# Patient Record
Sex: FEMALE | Race: WHITE | NOT HISPANIC OR LATINO | Employment: FULL TIME | URBAN - METROPOLITAN AREA
[De-identification: names, ages, dates, MRNs, and addresses within clinical notes are randomized per-mention and may not be internally consistent; named-entity substitution may affect disease eponyms.]

---

## 2017-01-23 ENCOUNTER — OFFICE VISIT (OUTPATIENT)
Dept: FAMILY MEDICINE | Facility: CLINIC | Age: 37
End: 2017-01-23
Payer: COMMERCIAL

## 2017-01-23 VITALS
HEART RATE: 95 BPM | OXYGEN SATURATION: 97 % | DIASTOLIC BLOOD PRESSURE: 70 MMHG | SYSTOLIC BLOOD PRESSURE: 122 MMHG | WEIGHT: 142.63 LBS | BODY MASS INDEX: 28 KG/M2 | HEIGHT: 60 IN | TEMPERATURE: 99 F

## 2017-01-23 DIAGNOSIS — R51.9 HEADACHE, UNSPECIFIED HEADACHE TYPE: Primary | ICD-10-CM

## 2017-01-23 PROCEDURE — 1159F MED LIST DOCD IN RCRD: CPT | Mod: S$GLB,,, | Performed by: FAMILY MEDICINE

## 2017-01-23 PROCEDURE — 99202 OFFICE O/P NEW SF 15 MIN: CPT | Mod: S$GLB,,, | Performed by: FAMILY MEDICINE

## 2017-01-23 RX ORDER — ETONOGESTREL AND ETHINYL ESTRADIOL VAGINAL RING .015; .12 MG/D; MG/D
1 RING VAGINAL
COMMUNITY
End: 2022-12-19

## 2017-01-23 RX ORDER — METOPROLOL TARTRATE 25 MG/1
25 TABLET, FILM COATED ORAL 2 TIMES DAILY
Qty: 60 TABLET | Refills: 3 | Status: SHIPPED | OUTPATIENT
Start: 2017-01-23 | End: 2017-05-30 | Stop reason: SDUPTHER

## 2017-01-23 RX ORDER — AMOXICILLIN 500 MG
2 CAPSULE ORAL DAILY
COMMUNITY
End: 2022-12-19

## 2017-01-23 RX ORDER — CHOLECALCIFEROL (VITAMIN D3) 25 MCG
12000 TABLET ORAL DAILY
COMMUNITY
End: 2022-12-19

## 2017-01-23 RX ORDER — ACETAMINOPHEN AND PHENYLEPHRINE HCL 325; 5 MG/1; MG/1
TABLET ORAL DAILY
COMMUNITY
End: 2022-12-19

## 2017-01-23 RX ORDER — SUMATRIPTAN SUCCINATE 100 MG/1
100 TABLET ORAL
Qty: 9 TABLET | Refills: 0 | Status: SHIPPED | OUTPATIENT
Start: 2017-01-23 | End: 2017-02-01

## 2017-01-23 NOTE — MR AVS SNAPSHOT
[facility]  [address] [place] [id number]  Phone: [phone]  Fax: [phone]                  Nora Carbajal   2017 1:20 PM   Office Visit    Description:  Female : 1980   Provider:  Jose Fernandez MD   Department:  [facility]           Reason for Visit     Headache     Tinnitus                To Do List           Goals (5 Years of Data)     None       These Medications        Disp Refills Start End    metoprolol tartrate (LOPRESSOR) 25 MG tablet 60 tablet 3 2017    Take 1 tablet (25 mg total) by mouth 2 (two) times daily. - Oral    Pharmacy: [facility]/pharmacy #[id number] [address] AT [facility] #: [phone]       sumatriptan (IMITREX) 100 MG tablet 9 tablet 0 2017    Take 1 tablet (100 mg total) by mouth every 2 (two) hours as needed for Migraine. - Oral    Pharmacy: [facility]/pharmacy #[id number] [address] AT [facility] Ph #: [phone]         [facility] On [facility]     [facility] On Call Nurse Care Line -  Assistance  Registered nurses in the [facility] On Call Center provide clinical advisement, health education, appointment booking, and other advisory services.  Call for this free service at [phone].             Medications           Message regarding Medications     Verify the changes and/or additions to your medication regime listed below are the same as discussed with your clinician today.  If any of these changes or additions are incorrect, please notify your healthcare provider.        START taking these NEW medications        Refills    metoprolol tartrate (LOPRESSOR) 25 MG tablet 3    Sig: Take 1 tablet (25 mg total) by mouth 2 (two) times daily.    Class: Normal    Route: Oral    sumatriptan (IMITREX) 100 MG tablet 0    Sig: Take 1 tablet (100 mg total) by mouth every 2 (two) hours as needed for Migraine.    Class: Normal    Route:  Oral           Verify that the below list of medications is an accurate representation of the medications you are currently taking.  If none reported, the list may be blank. If incorrect, please contact your healthcare provider. Carry this list with you in case of emergency.           Current Medications     biotin 10,000 mcg Cap Take by mouth once daily.    etonogestrel-ethinyl estradiol (NUVARING) 0.12-0.015 mg/24 hr vaginal ring Place 1 each vaginally every 28 days.    ELLYN PRIMROSE/LINOLEIC/GAMOLENI (PRIMROSE OIL ORAL) Take by mouth.    fish oil-omega-3 fatty acids 300-1,000 mg capsule Take 2 g by mouth once daily.    vitamin D 1000 units Tab Take 12,000 mg by mouth once daily.    metoprolol tartrate (LOPRESSOR) 25 MG tablet Take 1 tablet (25 mg total) by mouth 2 (two) times daily.    sumatriptan (IMITREX) 100 MG tablet Take 1 tablet (100 mg total) by mouth every 2 (two) hours as needed for Migraine.           Clinical Reference Information           Vital Signs - Last Recorded  Most recent update: 1/23/2017  1:52 PM by Rachana Sarabia MA    BP Pulse Temp Ht Wt SpO2    122/70 (BP Location: Right arm, Patient Position: Sitting) 95 98.7 °F (37.1 °C) 5' (1.524 m) 64.7 kg (142 lb 10.2 oz) 97%    BMI                27.86 kg/m2          Blood Pressure          Most Recent Value    BP  122/70      Allergies as of 1/23/2017     Vancomycin Analogues      Immunizations Administered on Date of Encounter - 1/23/2017     None

## 2017-01-24 NOTE — PROGRESS NOTES
Patient ID: Nora Carbajal is a 36 y.o. female.    Chief Complaint: Headache (frontal headaches) and Tinnitus (left ear constant sound, not ringing)    HPI       Nora Carbajal is a 36 y.o. femalecon of ant headache along forehead moderate in intensity with no full relief with Otc meds.  Hs of Ha occ but they go away, this one lingers.  Also so co nausea and ringing sound in her left ear.  Stress is stable.  No scotomata.        Review of Symptoms    Constitutional  Neg activity change, No chills/ fever   Resp  Neg hemoptysis, stridor, choking  CVS  Neg chest pain, palpitations    Physical Exam    Constitutional:   Oriented to person, place, and time.appears well-developed and well-nourished.   No distress.     HENT:   Head: Normocephalic and atraumatic.     Right Ear: Tympanic membrane, external ear and ear canal normal     Left Ear: Tympanic membrane, external ear and ear canal normal    Nose: External Normal. Normal turbinates, No rhinorrhea     Mouth/Throat: Uvula is midline, oropharynx is clear and moist and mucous membranes are normal.     Neck: supple no anterior cervical adenopathy(Unless checked) (except below)  [] Anterior Cervical Adenopathy    Carotid artery:  Bruit    NEG []   POS[]    Eyes:   Conjunctivae are normal. Right eye exhibits no discharge. Left eye exhibits no discharge. No scleral icterus. No periorbital edema       Cardiovascular:  Regular rate, Regular rhythm     No extrasystole  Normal S1-S2    Pulmonary/Chest:   Normal effort and breath sounds.  No wheezing, rhonchi or rales.      Musculoskeletal:  No edema. No obvious deformity No waisting     Neurological:   Alert and oriented to person, place, and time. Coordination normal.     Skin:   Skin is warm and dry.   No diaphoresis.   No rash noted.    Psychiatric: Normal mood and affect. Behavior is normal. Judgment and thought content normal.       Assessment / Plan:      ICD-10-CM ICD-9-CM   1. Headache, unspecified headache type R51  784.0     Headache, unspecified headache type  Comments:  may be migrain type.   use bb 12.5 or 25 bid and sumatriptan for HA    Other orders  -     metoprolol tartrate (LOPRESSOR) 25 MG tablet; Take 1 tablet (25 mg total) by mouth 2 (two) times daily.  Dispense: 60 tablet; Refill: 3  -     sumatriptan (IMITREX) 100 MG tablet; Take 1 tablet (100 mg total) by mouth every 2 (two) hours as needed for Migraine.  Dispense: 9 tablet; Refill: 0      Labs to be done later date with ob/gyn

## 2017-05-30 RX ORDER — METOPROLOL TARTRATE 25 MG/1
25 TABLET, FILM COATED ORAL 2 TIMES DAILY
Qty: 60 TABLET | Refills: 3 | Status: SHIPPED | OUTPATIENT
Start: 2017-05-30 | End: 2022-12-19

## 2017-12-08 ENCOUNTER — LAB VISIT (OUTPATIENT)
Dept: LAB | Facility: HOSPITAL | Age: 37
End: 2017-12-08
Attending: FAMILY MEDICINE
Payer: COMMERCIAL

## 2017-12-08 DIAGNOSIS — Z13.220 ENCOUNTER FOR SCREENING FOR LIPOID DISORDERS: ICD-10-CM

## 2017-12-08 DIAGNOSIS — D51.3 OTHER DIETARY VITAMIN B12 DEFICIENCY ANEMIA: ICD-10-CM

## 2017-12-08 DIAGNOSIS — G43.009 MIGRAINE WITHOUT AURA AND WITHOUT STATUS MIGRAINOSUS, NOT INTRACTABLE: Primary | ICD-10-CM

## 2017-12-08 LAB
25(OH)D3+25(OH)D2 SERPL-MCNC: 29 NG/ML
ALBUMIN SERPL BCP-MCNC: 4.3 G/DL
ALP SERPL-CCNC: 82 U/L
ALT SERPL W/O P-5'-P-CCNC: 33 U/L
ANION GAP SERPL CALC-SCNC: 10 MMOL/L
AST SERPL-CCNC: 31 U/L
BASOPHILS # BLD AUTO: 0.04 K/UL
BASOPHILS NFR BLD: 0.7 %
BILIRUB SERPL-MCNC: 0.7 MG/DL
BUN SERPL-MCNC: 14 MG/DL
CALCIUM SERPL-MCNC: 9.2 MG/DL
CHLORIDE SERPL-SCNC: 105 MMOL/L
CHOLEST SERPL-MCNC: 211 MG/DL
CHOLEST/HDLC SERPL: 2.4 {RATIO}
CO2 SERPL-SCNC: 27 MMOL/L
CREAT SERPL-MCNC: 0.86 MG/DL
DIFFERENTIAL METHOD: ABNORMAL
EOSINOPHIL # BLD AUTO: 0.1 K/UL
EOSINOPHIL NFR BLD: 0.8 %
ERYTHROCYTE [DISTWIDTH] IN BLOOD BY AUTOMATED COUNT: 12.4 %
EST. GFR  (AFRICAN AMERICAN): >60 ML/MIN/1.73 M^2
EST. GFR  (NON AFRICAN AMERICAN): >60 ML/MIN/1.73 M^2
GLUCOSE SERPL-MCNC: 91 MG/DL
HCT VFR BLD AUTO: 37.6 %
HDLC SERPL-MCNC: 88 MG/DL
HDLC SERPL: 41.7 %
HGB BLD-MCNC: 12.5 G/DL
LDLC SERPL CALC-MCNC: 114.6 MG/DL
LYMPHOCYTES # BLD AUTO: 1.7 K/UL
LYMPHOCYTES NFR BLD: 29.3 %
MAGNESIUM SERPL-MCNC: 2 MG/DL
MCH RBC QN AUTO: 35.6 PG
MCHC RBC AUTO-ENTMCNC: 33.2 G/DL
MCV RBC AUTO: 107 FL
MONOCYTES # BLD AUTO: 0.7 K/UL
MONOCYTES NFR BLD: 11.1 %
NEUTROPHILS # BLD AUTO: 3.4 K/UL
NEUTROPHILS NFR BLD: 57.9 %
NONHDLC SERPL-MCNC: 123 MG/DL
PLATELET # BLD AUTO: 249 K/UL
PMV BLD AUTO: 9.2 FL
POTASSIUM SERPL-SCNC: 4.3 MMOL/L
PROT SERPL-MCNC: 7.3 G/DL
RBC # BLD AUTO: 3.51 M/UL
SODIUM SERPL-SCNC: 142 MMOL/L
TRIGL SERPL-MCNC: 42 MG/DL
TSH SERPL DL<=0.005 MIU/L-ACNC: 0.81 UIU/ML
VIT B12 SERPL-MCNC: 237 PG/ML
WBC # BLD AUTO: 5.93 K/UL

## 2017-12-08 PROCEDURE — 82607 VITAMIN B-12: CPT | Mod: PO

## 2017-12-08 PROCEDURE — 85025 COMPLETE CBC W/AUTO DIFF WBC: CPT | Mod: PO

## 2017-12-08 PROCEDURE — 36415 COLL VENOUS BLD VENIPUNCTURE: CPT | Mod: PO

## 2017-12-08 PROCEDURE — 80053 COMPREHEN METABOLIC PANEL: CPT | Mod: PO

## 2017-12-08 PROCEDURE — 84443 ASSAY THYROID STIM HORMONE: CPT | Mod: PO

## 2017-12-08 PROCEDURE — 80061 LIPID PANEL: CPT

## 2017-12-08 PROCEDURE — 82306 VITAMIN D 25 HYDROXY: CPT | Mod: PO

## 2017-12-08 PROCEDURE — 83735 ASSAY OF MAGNESIUM: CPT | Mod: PO

## 2017-12-11 ENCOUNTER — PATIENT MESSAGE (OUTPATIENT)
Dept: FAMILY MEDICINE | Facility: CLINIC | Age: 37
End: 2017-12-11